# Patient Record
Sex: FEMALE | Race: WHITE
[De-identification: names, ages, dates, MRNs, and addresses within clinical notes are randomized per-mention and may not be internally consistent; named-entity substitution may affect disease eponyms.]

---

## 2020-03-26 ENCOUNTER — HOSPITAL ENCOUNTER (OUTPATIENT)
Dept: HOSPITAL 92 - BICRAD | Age: 25
Discharge: HOME | End: 2020-03-26
Attending: FAMILY MEDICINE
Payer: OTHER GOVERNMENT

## 2020-03-26 DIAGNOSIS — M25.521: Primary | ICD-10-CM

## 2020-03-26 DIAGNOSIS — M25.522: ICD-10-CM

## 2020-03-26 NOTE — RAD
Left elbow 4 views



HISTORY: Elbow pain.



FINDINGS: Radiocapitellar alignment is maintained. Joint spaces preserved. No acute fracture, disloca
tion, or fluid distention of the joint capsule evident.



  



IMPRESSION :

Normal exam.



Reported By: BACILIO Tabares 

Electronically Signed:  3/26/2020 12:35 PM

## 2020-03-26 NOTE — RAD
RIGHT ELBOW:

3/26/20

 

Four views.

 

INDICATIONS:

Right elbow pain. 

 

Slight irregularity is seen at the coronoid best appreciated in the lateral projection. This could po
tentially represent old trauma. There is also hypertrophic prominence at the medial epicondyle of the
 distal humerus which is asymmetric when compared to the left elbow. This might also be result of narendra
or ligamentous trauma. 

 

There is no evidence of joint effusion. There is no evidence of acute fracture. 

 

IMPRESSION: 

Irregularity at the coronoid and hypertrophic change at the medial epicondyle of the distal humerus. 
Question old injury. There is no evidence of acute fracture. 

 

POS: [facility]